# Patient Record
Sex: MALE | Race: WHITE | NOT HISPANIC OR LATINO
[De-identification: names, ages, dates, MRNs, and addresses within clinical notes are randomized per-mention and may not be internally consistent; named-entity substitution may affect disease eponyms.]

---
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
## 2018-10-06 ENCOUNTER — APPOINTMENT (OUTPATIENT)
Dept: PEDIATRIC DEVELOPMENTAL SERVICES | Facility: CLINIC | Age: 18
End: 2018-10-06
Payer: COMMERCIAL

## 2018-10-06 PROCEDURE — 90791 PSYCH DIAGNOSTIC EVALUATION: CPT

## 2018-10-27 ENCOUNTER — APPOINTMENT (OUTPATIENT)
Dept: PEDIATRIC DEVELOPMENTAL SERVICES | Facility: CLINIC | Age: 18
End: 2018-10-27
Payer: COMMERCIAL

## 2018-10-27 PROCEDURE — 90847 FAMILY PSYTX W/PT 50 MIN: CPT

## 2018-12-20 ENCOUNTER — APPOINTMENT (OUTPATIENT)
Dept: PEDIATRIC DEVELOPMENTAL SERVICES | Facility: CLINIC | Age: 18
End: 2018-12-20
Payer: COMMERCIAL

## 2018-12-20 PROCEDURE — 90834 PSYTX W PT 45 MINUTES: CPT

## 2018-12-29 ENCOUNTER — APPOINTMENT (OUTPATIENT)
Dept: PEDIATRIC DEVELOPMENTAL SERVICES | Facility: CLINIC | Age: 18
End: 2018-12-29
Payer: COMMERCIAL

## 2018-12-29 PROCEDURE — 90847 FAMILY PSYTX W/PT 50 MIN: CPT

## 2019-02-02 ENCOUNTER — APPOINTMENT (OUTPATIENT)
Dept: PEDIATRIC DEVELOPMENTAL SERVICES | Facility: CLINIC | Age: 19
End: 2019-02-02
Payer: COMMERCIAL

## 2019-02-02 PROCEDURE — 90847 FAMILY PSYTX W/PT 50 MIN: CPT

## 2019-03-02 ENCOUNTER — APPOINTMENT (OUTPATIENT)
Dept: PEDIATRIC DEVELOPMENTAL SERVICES | Facility: CLINIC | Age: 19
End: 2019-03-02

## 2019-03-23 ENCOUNTER — APPOINTMENT (OUTPATIENT)
Dept: PEDIATRIC DEVELOPMENTAL SERVICES | Facility: CLINIC | Age: 19
End: 2019-03-23

## 2020-10-20 ENCOUNTER — APPOINTMENT (OUTPATIENT)
Dept: INTERNAL MEDICINE | Facility: CLINIC | Age: 20
End: 2020-10-20
Payer: COMMERCIAL

## 2020-10-20 VITALS
TEMPERATURE: 97.4 F | WEIGHT: 196 LBS | HEIGHT: 73 IN | BODY MASS INDEX: 25.98 KG/M2 | HEART RATE: 94 BPM | SYSTOLIC BLOOD PRESSURE: 123 MMHG | DIASTOLIC BLOOD PRESSURE: 87 MMHG | OXYGEN SATURATION: 97 %

## 2020-10-20 DIAGNOSIS — R30.0 DYSURIA: ICD-10-CM

## 2020-10-20 PROCEDURE — 36415 COLL VENOUS BLD VENIPUNCTURE: CPT

## 2020-10-20 PROCEDURE — 99385 PREV VISIT NEW AGE 18-39: CPT | Mod: 25

## 2020-10-20 NOTE — HISTORY OF PRESENT ILLNESS
[Parent] : parent [FreeTextEntry1] : establish care [de-identified] : 21yo M with PMH of autistim spectrum disorder presents to establish care. His last CPE was with his pediatrician in 2018. He denies any complaints today. Father states that he occasionally complains of urinary discomfort. Patient states that sometimes he feels burning with urination, denies any blood in urine. This does not occur daily.

## 2020-10-20 NOTE — PHYSICAL EXAM
[No Acute Distress] : no acute distress [Well Nourished] : well nourished [Well Developed] : well developed [Well-Appearing] : well-appearing [Normal Sclera/Conjunctiva] : normal sclera/conjunctiva [EOMI] : extraocular movements intact [PERRL] : pupils equal round and reactive to light [Normal Outer Ear/Nose] : the outer ears and nose were normal in appearance [No Respiratory Distress] : no respiratory distress  [No Accessory Muscle Use] : no accessory muscle use [Clear to Auscultation] : lungs were clear to auscultation bilaterally [Normal Rate] : normal rate  [Regular Rhythm] : with a regular rhythm [Normal S1, S2] : normal S1 and S2 [No Edema] : there was no peripheral edema [Soft] : abdomen soft [Non Tender] : non-tender [Non-distended] : non-distended [Normal Bowel Sounds] : normal bowel sounds [No CVA Tenderness] : no CVA  tenderness [Grossly Normal Strength/Tone] : grossly normal strength/tone [Coordination Grossly Intact] : coordination grossly intact [No Focal Deficits] : no focal deficits [Normal Gait] : normal gait [Normal Affect] : the affect was normal [Normal Insight/Judgement] : insight and judgment were intact [Alert and Oriented x3] : oriented to person, place, and time

## 2020-10-20 NOTE — HEALTH RISK ASSESSMENT
[No] : No [None] : None [Feels Safe at Home] : Feels safe at home [With Family] : lives with family [] : No [Change in mental status noted] : No change in mental status noted [Language] : denies difficulty with language [Reports changes in hearing] : Reports no changes in hearing [Reports changes in vision] : Reports no changes in vision [Reports changes in dental health] : Reports no changes in dental health

## 2020-10-20 NOTE — PLAN
[FreeTextEntry1] : HCM\par -routine labs, follow up results\par -check UA to evaluate dysuria- discussed seeing urologist \par -flu shot UTD\par -to have prior evaluations and records brought to office, requires letter for disability \par -CPE in 1 year or sooner visit as needed\par

## 2020-10-21 LAB
25(OH)D3 SERPL-MCNC: 11.5 NG/ML
ALBUMIN SERPL ELPH-MCNC: 4.9 G/DL
ALP BLD-CCNC: 92 U/L
ALT SERPL-CCNC: 54 U/L
ANION GAP SERPL CALC-SCNC: 11 MMOL/L
APPEARANCE: CLEAR
AST SERPL-CCNC: 37 U/L
BACTERIA: NEGATIVE
BASOPHILS # BLD AUTO: 0.05 K/UL
BASOPHILS NFR BLD AUTO: 0.8 %
BILIRUB SERPL-MCNC: 0.9 MG/DL
BILIRUBIN URINE: NEGATIVE
BLOOD URINE: NEGATIVE
BUN SERPL-MCNC: 10 MG/DL
CALCIUM SERPL-MCNC: 9.8 MG/DL
CHLORIDE SERPL-SCNC: 100 MMOL/L
CHOLEST SERPL-MCNC: 140 MG/DL
CO2 SERPL-SCNC: 29 MMOL/L
COLOR: YELLOW
CREAT SERPL-MCNC: 0.9 MG/DL
EOSINOPHIL # BLD AUTO: 0.12 K/UL
EOSINOPHIL NFR BLD AUTO: 1.9 %
ESTIMATED AVERAGE GLUCOSE: 85 MG/DL
GLUCOSE QUALITATIVE U: NEGATIVE
GLUCOSE SERPL-MCNC: 88 MG/DL
HBA1C MFR BLD HPLC: 4.6 %
HCT VFR BLD CALC: 46.4 %
HDLC SERPL-MCNC: 26 MG/DL
HGB BLD-MCNC: 15.8 G/DL
HYALINE CASTS: 0 /LPF
IMM GRANULOCYTES NFR BLD AUTO: 0.2 %
KETONES URINE: NEGATIVE
LDLC SERPL CALC-MCNC: 55 MG/DL
LEUKOCYTE ESTERASE URINE: NEGATIVE
LYMPHOCYTES # BLD AUTO: 2.05 K/UL
LYMPHOCYTES NFR BLD AUTO: 33 %
MAN DIFF?: NORMAL
MCHC RBC-ENTMCNC: 30.7 PG
MCHC RBC-ENTMCNC: 34.1 GM/DL
MCV RBC AUTO: 90.3 FL
MICROSCOPIC-UA: NORMAL
MONOCYTES # BLD AUTO: 0.45 K/UL
MONOCYTES NFR BLD AUTO: 7.2 %
NEUTROPHILS # BLD AUTO: 3.53 K/UL
NEUTROPHILS NFR BLD AUTO: 56.9 %
NITRITE URINE: NEGATIVE
NONHDLC SERPL-MCNC: 114 MG/DL
PH URINE: 7
PLATELET # BLD AUTO: 251 K/UL
POTASSIUM SERPL-SCNC: 4.7 MMOL/L
PROT SERPL-MCNC: 7.6 G/DL
PROTEIN URINE: NEGATIVE
RBC # BLD: 5.14 M/UL
RBC # FLD: 12.7 %
RED BLOOD CELLS URINE: 0 /HPF
SODIUM SERPL-SCNC: 140 MMOL/L
SPECIFIC GRAVITY URINE: 1.02
SQUAMOUS EPITHELIAL CELLS: 0 /HPF
TRIGL SERPL-MCNC: 294 MG/DL
TSH SERPL-ACNC: 1.19 UIU/ML
UROBILINOGEN URINE: NORMAL
WBC # FLD AUTO: 6.21 K/UL
WHITE BLOOD CELLS URINE: 0 /HPF

## 2020-11-03 ENCOUNTER — NON-APPOINTMENT (OUTPATIENT)
Age: 20
End: 2020-11-03

## 2020-12-24 ENCOUNTER — RX RENEWAL (OUTPATIENT)
Age: 20
End: 2020-12-24

## 2021-06-09 ENCOUNTER — APPOINTMENT (OUTPATIENT)
Dept: INTERNAL MEDICINE | Facility: CLINIC | Age: 21
End: 2021-06-09
Payer: COMMERCIAL

## 2021-06-09 PROCEDURE — 36415 COLL VENOUS BLD VENIPUNCTURE: CPT

## 2021-06-09 PROCEDURE — 99072 ADDL SUPL MATRL&STAF TM PHE: CPT

## 2021-06-10 LAB
ALBUMIN SERPL ELPH-MCNC: 5.2 G/DL
ALP BLD-CCNC: 92 U/L
ALT SERPL-CCNC: 37 U/L
AST SERPL-CCNC: 23 U/L
BILIRUB DIRECT SERPL-MCNC: 0.4 MG/DL
BILIRUB INDIRECT SERPL-MCNC: 1.9 MG/DL
BILIRUB SERPL-MCNC: 2.4 MG/DL
FERRITIN SERPL-MCNC: 99 NG/ML
HAV IGM SER QL: NONREACTIVE
HBV CORE IGM SER QL: NONREACTIVE
HBV SURFACE AG SER QL: NONREACTIVE
HCV AB SER QL: NONREACTIVE
HCV S/CO RATIO: 0.18 S/CO
IRON SATN MFR SERPL: 40 %
IRON SERPL-MCNC: 160 UG/DL
PROT SERPL-MCNC: 8.3 G/DL
TIBC SERPL-MCNC: 403 UG/DL
UIBC SERPL-MCNC: 243 UG/DL

## 2021-06-12 ENCOUNTER — APPOINTMENT (OUTPATIENT)
Dept: ULTRASOUND IMAGING | Facility: CLINIC | Age: 21
End: 2021-06-12
Payer: COMMERCIAL

## 2021-06-12 ENCOUNTER — OUTPATIENT (OUTPATIENT)
Dept: OUTPATIENT SERVICES | Facility: HOSPITAL | Age: 21
LOS: 1 days | End: 2021-06-12
Payer: COMMERCIAL

## 2021-06-12 DIAGNOSIS — R17 UNSPECIFIED JAUNDICE: ICD-10-CM

## 2021-06-12 PROCEDURE — 76700 US EXAM ABDOM COMPLETE: CPT

## 2021-06-12 PROCEDURE — 76700 US EXAM ABDOM COMPLETE: CPT | Mod: 26

## 2021-06-18 ENCOUNTER — APPOINTMENT (OUTPATIENT)
Dept: GASTROENTEROLOGY | Facility: CLINIC | Age: 21
End: 2021-06-18
Payer: COMMERCIAL

## 2021-06-18 VITALS
BODY MASS INDEX: 23.66 KG/M2 | TEMPERATURE: 97.1 F | HEART RATE: 88 BPM | SYSTOLIC BLOOD PRESSURE: 124 MMHG | HEIGHT: 75 IN | WEIGHT: 190.25 LBS | OXYGEN SATURATION: 98 % | DIASTOLIC BLOOD PRESSURE: 80 MMHG

## 2021-06-18 PROCEDURE — 99202 OFFICE O/P NEW SF 15 MIN: CPT

## 2021-06-18 PROCEDURE — 99072 ADDL SUPL MATRL&STAF TM PHE: CPT

## 2021-06-18 NOTE — HISTORY OF PRESENT ILLNESS
[FreeTextEntry1] : I saw Mr. Gary Rivera in the office today.  The patient is a 20-year-old male who has been diagnosed with autism.  The patient has no chronic medical issues such as high blood pressure diabetes or coronary artery disease and his appetite is generally good with no dysphagia or unexplained weight loss.  The patient's parents were present and provided further information.  The patient's bowel movements are normal with no chronic abdominal discomfort.  The patient currently takes no prescription medications.  He has no caffeine intake and does not smoke or drink.  Most recently the patient had full blood work and on the initial liver testing he was noticed to have a very mildly elevated transaminase level his bilirubin was normal at that time and it was a nonfasting specimen.  The blood work was subsequently followed up on a fasting specimen and the patient was found to have an elevated bilirubin predominantly indirect.  His transaminase levels had returned to normal at that time.  The patient had a sonogram which was normal.  Testing for hepatitis was negative and iron studies did reveal normal levels however there was a high normal TIBC.  Saturation was in the 40 range.  The patient has no tattoos and has no family history of liver disease.  He does not take any herbal remedies.

## 2021-06-18 NOTE — PHYSICAL EXAM
[General Appearance - Alert] : alert [General Appearance - In No Acute Distress] : in no acute distress [General Appearance - Well Nourished] : well nourished [General Appearance - Well Developed] : well developed [Respiration, Rhythm And Depth] : normal respiratory rhythm and effort [Auscultation Breath Sounds / Voice Sounds] : lungs were clear to auscultation bilaterally [Apical Impulse] : the apical impulse was normal [Heart Rate And Rhythm] : heart rate was normal and rhythm regular [Heart Sounds] : normal S1 and S2 [Bowel Sounds] : normal bowel sounds [Abdomen Soft] : soft [Abdomen Tenderness] : non-tender [] : no hepato-splenomegaly

## 2021-06-18 NOTE — ASSESSMENT
[FreeTextEntry1] : I had a lengthy discussion with the patient and his parents who are present.  The blood tests were thoroughly reviewed as well as the sonogram and all results were discussed.  It appears as if the most likely diagnosis is Gilbert's syndrome.  The patient had a normal bilirubin level when he had a nonfasting blood work that did reveal a mildly elevated transaminase level.  However on a fasting blood work his indirect bilirubin was disproportionately elevated to the direct bilirubin indicating likely metabolic issue consistent with Gilbert"s syndrome.  There has been no recent metabolic stress or infection.  The patient does have a carbo centric diet with an elevated triglyceride level and I suspect that he may have mild underlying fatty liver which could have explained his initial transaminase elevation.  I reassured the patient that there is no sign of chronic liver disease in this patient.  Patient's hepatic synthetic function is very well preserved and I did state that on fasting blood work in the future this elevated bilirubin would more than likely be present.  The only thing that would require follow-up is his high normal iron studies.  There is no family history of hemochromatosis and this is unlikely.  The patient will return in 3 months for repeat blood work on a nonfasting specimen.  We will repeat his liver test at that time and also send markers for celiac disease.  The patient was told to eat a smarter diet including decreasing carbohydrates and increasing vegetable fiber and protein.  The parents will call me with any change in the patient's clinical status.

## 2021-09-16 ENCOUNTER — LABORATORY RESULT (OUTPATIENT)
Age: 21
End: 2021-09-16

## 2021-09-16 ENCOUNTER — APPOINTMENT (OUTPATIENT)
Dept: GASTROENTEROLOGY | Facility: CLINIC | Age: 21
End: 2021-09-16
Payer: COMMERCIAL

## 2021-09-16 VITALS
HEART RATE: 86 BPM | WEIGHT: 183 LBS | BODY MASS INDEX: 22.75 KG/M2 | OXYGEN SATURATION: 98 % | HEIGHT: 75 IN | SYSTOLIC BLOOD PRESSURE: 110 MMHG | TEMPERATURE: 96.7 F | DIASTOLIC BLOOD PRESSURE: 80 MMHG

## 2021-09-16 DIAGNOSIS — Z78.9 OTHER SPECIFIED HEALTH STATUS: ICD-10-CM

## 2021-09-16 DIAGNOSIS — R74.0 NONSPECIFIC ELEVATION OF LEVELS OF TRANSAMINASE AND LACTIC ACID DEHYDROGENASE [LDH]: ICD-10-CM

## 2021-09-16 DIAGNOSIS — Z82.49 FAMILY HISTORY OF ISCHEMIC HEART DISEASE AND OTHER DISEASES OF THE CIRCULATORY SYSTEM: ICD-10-CM

## 2021-09-16 DIAGNOSIS — R17 UNSPECIFIED JAUNDICE: ICD-10-CM

## 2021-09-16 PROCEDURE — 36415 COLL VENOUS BLD VENIPUNCTURE: CPT

## 2021-09-16 PROCEDURE — 99212 OFFICE O/P EST SF 10 MIN: CPT | Mod: 25

## 2021-09-16 NOTE — PHYSICAL EXAM
[General Appearance - In No Acute Distress] : in no acute distress [General Appearance - Alert] : alert [General Appearance - Well Nourished] : well nourished [Respiration, Rhythm And Depth] : normal respiratory rhythm and effort [Auscultation Breath Sounds / Voice Sounds] : lungs were clear to auscultation bilaterally [Apical Impulse] : the apical impulse was normal [Heart Rate And Rhythm] : heart rate was normal and rhythm regular [Heart Sounds] : normal S1 and S2 [Bowel Sounds] : normal bowel sounds [Abdomen Soft] : soft [Abdomen Tenderness] : non-tender [] : no hepato-splenomegaly

## 2021-09-16 NOTE — ASSESSMENT
[FreeTextEntry1] : Patient is here for follow-up regarding his abnormal liver function test.  Recent sonogram was negative for any hepatic parenchymal abnormalities and the patient's hepatic synthetic function is normal.  Repeat blood testing was drawn for evaluation of his liver enzymes as well as testing for celiac disease.  Once the notes are reviewed I will get back to the patient's father.  I do not feel any invasive work-up is in indicated at the present time unless his liver enzymes continue to increase.

## 2021-09-16 NOTE — HISTORY OF PRESENT ILLNESS
[FreeTextEntry1] : I saw your patient Gary Rivera  in follow-up today.  The patient is a 21-year-old male who has a history of autism spectrum.  Most recently he was seen for abnormal liver function tests which subsequently did improve.  Patient was worked up for viral hepatitides which were negative.  Only he enjoys good health and patient's father states that his appetite is good with no unexplained weight loss.  There has been no change in his gastrointestinal health.  The patient is here for follow-up and repeat blood testing.

## 2021-09-17 LAB
ALBUMIN SERPL ELPH-MCNC: 4.9 G/DL
ALP BLD-CCNC: 93 U/L
ALT SERPL-CCNC: 41 U/L
ANION GAP SERPL CALC-SCNC: 17 MMOL/L
AST SERPL-CCNC: 23 U/L
BASOPHILS # BLD AUTO: 0.04 K/UL
BASOPHILS NFR BLD AUTO: 0.6 %
BILIRUB SERPL-MCNC: 1.3 MG/DL
BUN SERPL-MCNC: 10 MG/DL
CALCIUM SERPL-MCNC: 9.6 MG/DL
CHLORIDE SERPL-SCNC: 98 MMOL/L
CO2 SERPL-SCNC: 25 MMOL/L
CREAT SERPL-MCNC: 0.92 MG/DL
EOSINOPHIL # BLD AUTO: 0.07 K/UL
EOSINOPHIL NFR BLD AUTO: 1 %
GLUCOSE SERPL-MCNC: 82 MG/DL
HCT VFR BLD CALC: 45.7 %
HGB BLD-MCNC: 15.4 G/DL
IGA SER QL IEP: 341 MG/DL
IMM GRANULOCYTES NFR BLD AUTO: 0.1 %
LYMPHOCYTES # BLD AUTO: 1.88 K/UL
LYMPHOCYTES NFR BLD AUTO: 26.7 %
MAN DIFF?: NORMAL
MCHC RBC-ENTMCNC: 30.2 PG
MCHC RBC-ENTMCNC: 33.7 GM/DL
MCV RBC AUTO: 89.6 FL
MONOCYTES # BLD AUTO: 0.46 K/UL
MONOCYTES NFR BLD AUTO: 6.5 %
NEUTROPHILS # BLD AUTO: 4.57 K/UL
NEUTROPHILS NFR BLD AUTO: 65.1 %
PLATELET # BLD AUTO: 283 K/UL
POTASSIUM SERPL-SCNC: 4.6 MMOL/L
PROT SERPL-MCNC: 7.7 G/DL
RBC # BLD: 5.1 M/UL
RBC # FLD: 12.5 %
SODIUM SERPL-SCNC: 140 MMOL/L
WBC # FLD AUTO: 7.03 K/UL

## 2021-09-22 LAB
CELIAC DISEASE INTERPRETATION: NORMAL
CELIAC GENE PAIRS PRESENT: YES
DQ ALPHA 1: NORMAL
DQ BETA 1: NORMAL
IMMUNOGLOBULIN A (IGA): 312 MG/DL

## 2021-10-21 ENCOUNTER — APPOINTMENT (OUTPATIENT)
Dept: INTERNAL MEDICINE | Facility: CLINIC | Age: 21
End: 2021-10-21

## 2022-09-09 ENCOUNTER — NON-APPOINTMENT (OUTPATIENT)
Age: 22
End: 2022-09-09

## 2023-04-14 ENCOUNTER — APPOINTMENT (OUTPATIENT)
Dept: INTERNAL MEDICINE | Facility: CLINIC | Age: 23
End: 2023-04-14
Payer: COMMERCIAL

## 2023-04-14 VITALS
RESPIRATION RATE: 15 BRPM | DIASTOLIC BLOOD PRESSURE: 88 MMHG | OXYGEN SATURATION: 98 % | HEART RATE: 87 BPM | BODY MASS INDEX: 23.62 KG/M2 | HEIGHT: 75 IN | WEIGHT: 190 LBS | SYSTOLIC BLOOD PRESSURE: 128 MMHG

## 2023-04-14 PROCEDURE — 99395 PREV VISIT EST AGE 18-39: CPT | Mod: 25

## 2023-04-14 PROCEDURE — 36415 COLL VENOUS BLD VENIPUNCTURE: CPT

## 2023-04-17 LAB
ALBUMIN SERPL ELPH-MCNC: 5.1 G/DL
ALP BLD-CCNC: 84 U/L
ALT SERPL-CCNC: 41 U/L
ANION GAP SERPL CALC-SCNC: 12 MMOL/L
AST SERPL-CCNC: 26 U/L
BILIRUB SERPL-MCNC: 1.7 MG/DL
BUN SERPL-MCNC: 9 MG/DL
CALCIUM SERPL-MCNC: 10.1 MG/DL
CHLORIDE SERPL-SCNC: 101 MMOL/L
CHOLEST SERPL-MCNC: 139 MG/DL
CO2 SERPL-SCNC: 29 MMOL/L
CREAT SERPL-MCNC: 0.95 MG/DL
EGFR: 115 ML/MIN/1.73M2
ESTIMATED AVERAGE GLUCOSE: 91 MG/DL
GLUCOSE SERPL-MCNC: 92 MG/DL
HBA1C MFR BLD HPLC: 4.8 %
HCT VFR BLD CALC: 45.4 %
HDLC SERPL-MCNC: 28 MG/DL
HGB BLD-MCNC: 15.5 G/DL
LDLC SERPL CALC-MCNC: 80 MG/DL
MCHC RBC-ENTMCNC: 30.4 PG
MCHC RBC-ENTMCNC: 34.1 GM/DL
MCV RBC AUTO: 89 FL
NONHDLC SERPL-MCNC: 110 MG/DL
PLATELET # BLD AUTO: 237 K/UL
POTASSIUM SERPL-SCNC: 4.6 MMOL/L
PROT SERPL-MCNC: 7.7 G/DL
RBC # BLD: 5.1 M/UL
RBC # FLD: 12.5 %
SODIUM SERPL-SCNC: 142 MMOL/L
TRIGL SERPL-MCNC: 152 MG/DL
TSH SERPL-ACNC: 0.92 UIU/ML
WBC # FLD AUTO: 4.57 K/UL

## 2023-11-01 RX ORDER — MULTIVIT-MIN/IRON/FOLIC ACID/K 18-600-40
50 MCG CAPSULE ORAL
Qty: 90 | Refills: 0 | Status: ACTIVE | COMMUNITY
Start: 2023-11-01 | End: 1900-01-01

## 2024-03-25 ENCOUNTER — NON-APPOINTMENT (OUTPATIENT)
Age: 24
End: 2024-03-25

## 2024-04-01 ENCOUNTER — APPOINTMENT (OUTPATIENT)
Dept: INTERNAL MEDICINE | Facility: CLINIC | Age: 24
End: 2024-04-01

## 2024-04-15 ENCOUNTER — APPOINTMENT (OUTPATIENT)
Dept: INTERNAL MEDICINE | Facility: CLINIC | Age: 24
End: 2024-04-15
Payer: COMMERCIAL

## 2024-04-15 VITALS
DIASTOLIC BLOOD PRESSURE: 90 MMHG | BODY MASS INDEX: 22.63 KG/M2 | SYSTOLIC BLOOD PRESSURE: 121 MMHG | RESPIRATION RATE: 15 BRPM | HEIGHT: 75 IN | WEIGHT: 182 LBS | TEMPERATURE: 98.8 F | HEART RATE: 106 BPM | OXYGEN SATURATION: 98 %

## 2024-04-15 DIAGNOSIS — R05.9 COUGH, UNSPECIFIED: ICD-10-CM

## 2024-04-15 DIAGNOSIS — E55.9 VITAMIN D DEFICIENCY, UNSPECIFIED: ICD-10-CM

## 2024-04-15 DIAGNOSIS — Z00.00 ENCOUNTER FOR GENERAL ADULT MEDICAL EXAMINATION W/OUT ABNORMAL FINDINGS: ICD-10-CM

## 2024-04-15 PROCEDURE — 99395 PREV VISIT EST AGE 18-39: CPT | Mod: 25

## 2024-04-15 PROCEDURE — 36415 COLL VENOUS BLD VENIPUNCTURE: CPT

## 2024-04-15 RX ORDER — MINOXIDIL 2.5 MG/1
TABLET ORAL
Refills: 0 | Status: ACTIVE | COMMUNITY

## 2024-04-15 NOTE — PLAN
[FreeTextEntry1] : Health Care Maintenance - routine labs, follow up results -- bloodwork performed in office -  influenza vaccine NOv 2023 - depression screen negative - h/o autism - continue lifestyle modifications - CPE in 1 year or sooner visit as needed  Cough - likely post viral cough - f/u rsv, influenza, covid swab - clear lungs on exam today - symptomatic management --> robitussin for cough, salt water gargles, steaming for congestion,. Advised to continue hydration with water

## 2024-04-15 NOTE — HEALTH RISK ASSESSMENT
[No] : In the past 12 months have you used drugs other than those required for medical reasons? No [0] : 2) Feeling down, depressed, or hopeless: Not at all (0) [PHQ-2 Negative - No further assessment needed] : PHQ-2 Negative - No further assessment needed [With Family] : lives with family [Employed] : employed [Single] : single [Feels Safe at Home] : Feels safe at home [Fully functional (bathing, dressing, toileting, transferring, walking, feeding)] : Fully functional (bathing, dressing, toileting, transferring, walking, feeding) [Smoke Detector] : smoke detector [Carbon Monoxide Detector] : carbon monoxide detector [Never] : Never [[id number]] : 0 [Change in mental status noted] : No change in mental status noted [None] : None [Reports changes in hearing] : Reports no changes in hearing [Reports changes in vision] : Reports no changes in vision [Reports changes in dental health] : Reports no changes in dental health [de-identified] : with parents [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

## 2024-04-15 NOTE — HISTORY OF PRESENT ILLNESS
[FreeTextEntry1] : CPE [de-identified] : 22 yo M pmh autism presents for CPE Accompanied by father.  Patient has had cough for the past 3 weeks Started as cough --> seen at [facility] 3/25/24. COVID testing negative. Told viral infection. Was prescribed cetirizine 10 mg, benzonatate and ipratropium bromide nasal spray and fluticasone. Was initially feeling better however went to [place] last week and  now has worsening congestion. Has been taking robitussin and honey with relief. Denies fevers, chills, nausea, vomiting, muscle aches, sick contacts, headaches.  Seeing dermatology for hair thinning. Had PRP treatments and now on minoxidil  2.5 mg QD   He lives at home with his parents. He works at horse stable. He went to college in [place].

## 2024-04-15 NOTE — REVIEW OF SYSTEMS
[Fever] : no fever [Chills] : no chills [Fatigue] : no fatigue [Vision Problems] : no vision problems [Chest Pain] : no chest pain [Palpitations] : no palpitations [Lower Ext Edema] : no lower extremity edema [Shortness Of Breath] : no shortness of breath [Wheezing] : no wheezing [Cough] : cough [Dyspnea on Exertion] : not dyspnea on exertion [Abdominal Pain] : no abdominal pain [Nausea] : no nausea [Constipation] : no constipation [Diarrhea] : no diarrhea [Vomiting] : no vomiting [Heartburn] : no heartburn [Melena] : no melena [Dysuria] : no dysuria [Hematuria] : no hematuria [Muscle Weakness] : no muscle weakness [Back Pain] : no back pain [Skin Rash] : no skin rash [Headache] : no headache [Dizziness] : no dizziness [Fainting] : no fainting [Depression] : no depression

## 2024-04-15 NOTE — PHYSICAL EXAM
[No Acute Distress] : no acute distress [Normal Sclera/Conjunctiva] : normal sclera/conjunctiva [PERRL] : pupils equal round and reactive to light [EOMI] : extraocular movements intact [Normal Outer Ear/Nose] : the outer ears and nose were normal in appearance [Normal Oropharynx] : the oropharynx was normal [Normal TMs] : both tympanic membranes were normal [Supple] : supple [No Respiratory Distress] : no respiratory distress  [No Accessory Muscle Use] : no accessory muscle use [Clear to Auscultation] : lungs were clear to auscultation bilaterally [Normal Rate] : normal rate  [Regular Rhythm] : with a regular rhythm [Normal S1, S2] : normal S1 and S2 [No Murmur] : no murmur heard [No Edema] : there was no peripheral edema [Soft] : abdomen soft [Non Tender] : non-tender [Non-distended] : non-distended [Normal Bowel Sounds] : normal bowel sounds [Speech Grossly Normal] : speech grossly normal

## 2024-04-16 LAB
25(OH)D3 SERPL-MCNC: 27.3 NG/ML
ALBUMIN SERPL ELPH-MCNC: 5 G/DL
ALP BLD-CCNC: 92 U/L
ALT SERPL-CCNC: 35 U/L
ANION GAP SERPL CALC-SCNC: 14 MMOL/L
AST SERPL-CCNC: 29 U/L
BILIRUB SERPL-MCNC: 1.3 MG/DL
BUN SERPL-MCNC: 9 MG/DL
CALCIUM SERPL-MCNC: 10 MG/DL
CHLORIDE SERPL-SCNC: 99 MMOL/L
CHOLEST SERPL-MCNC: 135 MG/DL
CO2 SERPL-SCNC: 26 MMOL/L
CREAT SERPL-MCNC: 1.13 MG/DL
EGFR: 92 ML/MIN/1.73M2
ESTIMATED AVERAGE GLUCOSE: 88 MG/DL
GLUCOSE SERPL-MCNC: 86 MG/DL
HBA1C MFR BLD HPLC: 4.7 %
HCT VFR BLD CALC: 46 %
HDLC SERPL-MCNC: 30 MG/DL
HGB BLD-MCNC: 15.7 G/DL
INFLUENZA A RESULT: NOT DETECTED
INFLUENZA B RESULT: NOT DETECTED
LDLC SERPL CALC-MCNC: 87 MG/DL
MCHC RBC-ENTMCNC: 29.9 PG
MCHC RBC-ENTMCNC: 34.1 GM/DL
MCV RBC AUTO: 87.6 FL
NONHDLC SERPL-MCNC: 105 MG/DL
PLATELET # BLD AUTO: 232 K/UL
POTASSIUM SERPL-SCNC: 4.1 MMOL/L
PROT SERPL-MCNC: 7.9 G/DL
RBC # BLD: 5.25 M/UL
RBC # FLD: 12.9 %
RESP SYN VIRUS RESULT: NOT DETECTED
SARS-COV-2 RESULT: NOT DETECTED
SODIUM SERPL-SCNC: 140 MMOL/L
TRIGL SERPL-MCNC: 95 MG/DL
TSH SERPL-ACNC: 0.35 UIU/ML
WBC # FLD AUTO: 5.97 K/UL

## 2024-06-04 ENCOUNTER — APPOINTMENT (OUTPATIENT)
Dept: INTERNAL MEDICINE | Facility: CLINIC | Age: 24
End: 2024-06-04
Payer: COMMERCIAL

## 2024-06-04 VITALS
HEIGHT: 75 IN | HEART RATE: 85 BPM | TEMPERATURE: 97.6 F | DIASTOLIC BLOOD PRESSURE: 80 MMHG | BODY MASS INDEX: 22.75 KG/M2 | WEIGHT: 183 LBS | OXYGEN SATURATION: 98 % | SYSTOLIC BLOOD PRESSURE: 115 MMHG

## 2024-06-04 DIAGNOSIS — F32.A ANXIETY DISORDER, UNSPECIFIED: ICD-10-CM

## 2024-06-04 DIAGNOSIS — F84.0 AUTISTIC DISORDER: ICD-10-CM

## 2024-06-04 DIAGNOSIS — L65.9 NONSCARRING HAIR LOSS, UNSPECIFIED: ICD-10-CM

## 2024-06-04 DIAGNOSIS — F41.9 ANXIETY DISORDER, UNSPECIFIED: ICD-10-CM

## 2024-06-04 PROCEDURE — G0444 DEPRESSION SCREEN ANNUAL: CPT | Mod: 59

## 2024-06-04 PROCEDURE — 99214 OFFICE O/P EST MOD 30 MIN: CPT | Mod: 25

## 2024-06-04 RX ORDER — ESCITALOPRAM OXALATE 5 MG/1
5 TABLET ORAL
Qty: 90 | Refills: 0 | Status: ACTIVE | COMMUNITY
Start: 2024-06-04 | End: 1900-01-01

## 2024-06-04 RX ORDER — ELECTROLYTES/DEXTROSE
SOLUTION, ORAL ORAL
Qty: 90 | Refills: 0 | Status: ACTIVE | COMMUNITY
Start: 2023-11-01 | End: 1900-01-01

## 2024-06-05 PROBLEM — L65.9 HAIR LOSS: Status: ACTIVE | Noted: 2024-06-05

## 2024-06-05 PROBLEM — F84.0 AUTISM SPECTRUM DISORDER: Status: ACTIVE | Noted: 2018-12-29

## 2024-06-05 NOTE — HEALTH RISK ASSESSMENT
[1] : 1) Little interest or pleasure doing things for several days (1) [3] : 2) Feeling down, depressed, or hopeless for nearly every day (3) [Nearly Every Day (3)] : 2.) Feeling down, depressed or hopeless? Nearly every day [1/2 of Days or More (2)] : 4.) Feeling tired or having little energy? Half the days or more [Several Days (1)] : 8.) Moving or speaking so slowly that other people could have noticed, or the opposite, moving or speaking faster than usual? Several days [Not at All (0)] : 9.) Thoughts that you would be off dead or of hurting yourself in some way? Not at all [0] : 1) Little interest or pleasure doing things: Not at all (0) [PHQ-2 Positive] : PHQ-2 Positive [I have developed a follow-up plan documented below in the note.] : I have developed a follow-up plan documented below in the note. [Time Spent: ___ Minutes] : I spent [unfilled] minutes performing a depression screening for this patient. [PHQ-9 Positive] : PHQ-9 Positive [[id number]] : 3 [[id number]] : 9

## 2024-06-05 NOTE — PHYSICAL EXAM
[No Acute Distress] : no acute distress [Well-Appearing] : well-appearing [Normal Sclera/Conjunctiva] : normal sclera/conjunctiva [EOMI] : extraocular movements intact [No Respiratory Distress] : no respiratory distress  [No Accessory Muscle Use] : no accessory muscle use [Clear to Auscultation] : lungs were clear to auscultation bilaterally [Normal Rate] : normal rate  [Normal S1, S2] : normal S1 and S2 [Regular Rhythm] : with a regular rhythm [No Edema] : there was no peripheral edema [No Extremity Clubbing/Cyanosis] : no extremity clubbing/cyanosis [Soft] : abdomen soft [Non Tender] : non-tender [Non-distended] : non-distended [Normal Bowel Sounds] : normal bowel sounds [Normal Affect] : the affect was normal [Normal Insight/Judgement] : insight and judgment were intact [de-identified] : no bald patches on on scalp, no evidence of hair breakage

## 2024-06-05 NOTE — HISTORY OF PRESENT ILLNESS
[FreeTextEntry8] : AMADOR MANJARREZ is a 23 year M who presents for anxiety/depression PMHx autism   Reports heightened anxiety recently and feeling overwhelmed. Onset after sudden passing of paternal grandfather 4/2023. His sister then got  5/2024. The 2 events back to back caused a roller coaster of emotions for patient. Also reports intermittent feelings of depression. Denies anhedonia. 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 Previously has followed with therapist, would like see start seeing one again. Of note, sister takes lexapro with good effect.   Is concerned about recent hair loss. First noted a few weeks ago, S/p a few PRP injections with Dr. Salud Ortiz  Accompanied by father

## 2024-06-05 NOTE — ASSESSMENT
[FreeTextEntry1] : Anxiety and depression -start lexapro 5mg qd -advised that SSRI take several weeks to reach maximum effect; AE discussed  -patient contracts for safety at this time, denies si/hi -referral given for therapist -I spent more than 5 minutes with the patient conducting a PHQ-9 screen and discussing results with patient during this encounter  Hair loss -suspected multifactorial, 2/2 recent stress as well FHx; dad who is present at appt has noted hair loss -reviewed recent labs with patient --> no evidence of thyroid disease or anemia --> advised OTC for vit D deficiency -c/w to follow with derm Dr. Salud Ortiz, s/p PRP injections  RTC in 2 months

## 2024-08-06 ENCOUNTER — APPOINTMENT (OUTPATIENT)
Dept: INTERNAL MEDICINE | Facility: CLINIC | Age: 24
End: 2024-08-06

## 2024-08-06 PROCEDURE — 99213 OFFICE O/P EST LOW 20 MIN: CPT

## 2024-08-06 PROCEDURE — G2211 COMPLEX E/M VISIT ADD ON: CPT

## 2024-08-06 NOTE — HISTORY OF PRESENT ILLNESS
[FreeTextEntry1] : follow up [de-identified] : AMADOR MANJARREZ is a 23 year M who presents for follow up PMHx autism, anxiety/depression Reports he is doing well on lexapro. Mood and anxiety improving but would like to increase dose.  Accompanied by mom

## 2024-08-06 NOTE — ASSESSMENT
[FreeTextEntry1] : Anxiety and depression -tolerating lexapro, will increase to 10mg qd  Elevated BP -bp initially elevated as he was triggered by traffic and thunder/lightening -repeat normal

## 2024-08-06 NOTE — PHYSICAL EXAM
[No Acute Distress] : no acute distress [Well-Appearing] : well-appearing [Normal Sclera/Conjunctiva] : normal sclera/conjunctiva [EOMI] : extraocular movements intact [No Respiratory Distress] : no respiratory distress  [No Accessory Muscle Use] : no accessory muscle use [Clear to Auscultation] : lungs were clear to auscultation bilaterally [Normal Rate] : normal rate  [Regular Rhythm] : with a regular rhythm [Normal S1, S2] : normal S1 and S2 [Alert and Oriented x3] : oriented to person, place, and time [Normal Mood] : the mood was normal

## 2024-10-01 ENCOUNTER — APPOINTMENT (OUTPATIENT)
Dept: INTERNAL MEDICINE | Facility: CLINIC | Age: 24
End: 2024-10-01
Payer: COMMERCIAL

## 2024-10-01 VITALS
HEIGHT: 75 IN | OXYGEN SATURATION: 99 % | WEIGHT: 190 LBS | BODY MASS INDEX: 23.62 KG/M2 | HEART RATE: 77 BPM | DIASTOLIC BLOOD PRESSURE: 79 MMHG | TEMPERATURE: 98.2 F | SYSTOLIC BLOOD PRESSURE: 104 MMHG

## 2024-10-01 DIAGNOSIS — Z23 ENCOUNTER FOR IMMUNIZATION: ICD-10-CM

## 2024-10-01 DIAGNOSIS — F41.9 ANXIETY DISORDER, UNSPECIFIED: ICD-10-CM

## 2024-10-01 DIAGNOSIS — F32.A ANXIETY DISORDER, UNSPECIFIED: ICD-10-CM

## 2024-10-01 PROCEDURE — G0008: CPT

## 2024-10-01 PROCEDURE — 90656 IIV3 VACC NO PRSV 0.5 ML IM: CPT

## 2024-10-01 PROCEDURE — 99213 OFFICE O/P EST LOW 20 MIN: CPT | Mod: 25

## 2024-10-01 NOTE — ASSESSMENT
[FreeTextEntry1] : Anxiety/depression -increase Lexapro 20mg qd -advised to establish with therapist and resources given  HCM -flu vaccine given and tolerated well

## 2024-10-01 NOTE — PHYSICAL EXAM
[No Acute Distress] : no acute distress [Well-Appearing] : well-appearing [Normal Sclera/Conjunctiva] : normal sclera/conjunctiva [EOMI] : extraocular movements intact [No Respiratory Distress] : no respiratory distress  [No Accessory Muscle Use] : no accessory muscle use [Clear to Auscultation] : lungs were clear to auscultation bilaterally [Normal Rate] : normal rate  [Regular Rhythm] : with a regular rhythm [Normal S1, S2] : normal S1 and S2 [Alert and Oriented x3] : oriented to person, place, and time [de-identified] : answers questions appropriately

## 2024-10-01 NOTE — HISTORY OF PRESENT ILLNESS
[FreeTextEntry1] : follow up [de-identified] : AMADOR MANJARREZ is a 24 year M who presents for follow up PMHx autism, anxiety/depression  Feels well overall Mood and anxiety getting better but still ruminates frequently.  No AE reported.  Has not found a therapist yet but will continue looking.  Requests flu vaccine   Accompanied by sister

## 2024-12-30 ENCOUNTER — RX RENEWAL (OUTPATIENT)
Age: 24
End: 2024-12-30

## 2025-04-10 ENCOUNTER — APPOINTMENT (OUTPATIENT)
Dept: INTERNAL MEDICINE | Facility: CLINIC | Age: 25
End: 2025-04-10
Payer: COMMERCIAL

## 2025-04-10 VITALS
HEIGHT: 75 IN | BODY MASS INDEX: 25.36 KG/M2 | HEART RATE: 92 BPM | OXYGEN SATURATION: 98 % | SYSTOLIC BLOOD PRESSURE: 114 MMHG | WEIGHT: 204 LBS | TEMPERATURE: 98.2 F | DIASTOLIC BLOOD PRESSURE: 74 MMHG

## 2025-04-10 DIAGNOSIS — E55.9 VITAMIN D DEFICIENCY, UNSPECIFIED: ICD-10-CM

## 2025-04-10 DIAGNOSIS — F41.9 ANXIETY DISORDER, UNSPECIFIED: ICD-10-CM

## 2025-04-10 DIAGNOSIS — Z00.00 ENCOUNTER FOR GENERAL ADULT MEDICAL EXAMINATION W/OUT ABNORMAL FINDINGS: ICD-10-CM

## 2025-04-10 DIAGNOSIS — F32.A ANXIETY DISORDER, UNSPECIFIED: ICD-10-CM

## 2025-04-10 LAB
HCT VFR BLD CALC: 45.3 %
HGB BLD-MCNC: 15.5 G/DL
MCHC RBC-ENTMCNC: 30.3 PG
MCHC RBC-ENTMCNC: 34.2 G/DL
MCV RBC AUTO: 88.5 FL
PLATELET # BLD AUTO: 275 K/UL
RBC # BLD: 5.12 M/UL
RBC # FLD: 13 %
WBC # FLD AUTO: 4.56 K/UL

## 2025-04-10 PROCEDURE — 99395 PREV VISIT EST AGE 18-39: CPT

## 2025-04-10 PROCEDURE — 36415 COLL VENOUS BLD VENIPUNCTURE: CPT

## 2025-04-11 LAB
25(OH)D3 SERPL-MCNC: 15.2 NG/ML
ALBUMIN SERPL ELPH-MCNC: 4.6 G/DL
ALP BLD-CCNC: 92 U/L
ALT SERPL-CCNC: 48 U/L
ANION GAP SERPL CALC-SCNC: 11 MMOL/L
AST SERPL-CCNC: 29 U/L
BILIRUB SERPL-MCNC: 1.1 MG/DL
BUN SERPL-MCNC: 9 MG/DL
CALCIUM SERPL-MCNC: 9.7 MG/DL
CHLORIDE SERPL-SCNC: 102 MMOL/L
CHOLEST SERPL-MCNC: 147 MG/DL
CO2 SERPL-SCNC: 28 MMOL/L
CREAT SERPL-MCNC: 1.03 MG/DL
EGFRCR SERPLBLD CKD-EPI 2021: 103 ML/MIN/1.73M2
ESTIMATED AVERAGE GLUCOSE: 88 MG/DL
GLUCOSE SERPL-MCNC: 88 MG/DL
HBA1C MFR BLD HPLC: 4.7 %
HDLC SERPL-MCNC: 26 MG/DL
LDLC SERPL-MCNC: 88 MG/DL
NONHDLC SERPL-MCNC: 120 MG/DL
POTASSIUM SERPL-SCNC: 4.4 MMOL/L
PROT SERPL-MCNC: 7.6 G/DL
SODIUM SERPL-SCNC: 141 MMOL/L
TRIGL SERPL-MCNC: 188 MG/DL
TSH SERPL-ACNC: 0.66 UIU/ML

## 2025-06-18 ENCOUNTER — NON-APPOINTMENT (OUTPATIENT)
Age: 25
End: 2025-06-18

## 2025-07-02 ENCOUNTER — RX RENEWAL (OUTPATIENT)
Age: 25
End: 2025-07-02

## 2025-07-02 RX ORDER — MULTIVITAMIN 4.5; 60; .3; .28; 80; .1; .013; 20; 1.67; 25; .15; 26 MG/1; MG/1; MG/1; MG/1; MG/1; MG/1; MG/1; MG/1; MG/1; MG/1; MG/1; MG/1
TABLET ORAL
Qty: 90 | Refills: 3 | Status: ACTIVE | COMMUNITY
Start: 2025-07-02 | End: 1900-01-01